# Patient Record
Sex: FEMALE | Race: WHITE
[De-identification: names, ages, dates, MRNs, and addresses within clinical notes are randomized per-mention and may not be internally consistent; named-entity substitution may affect disease eponyms.]

---

## 2018-01-26 ENCOUNTER — HOSPITAL ENCOUNTER (EMERGENCY)
Dept: HOSPITAL 42 - ED | Age: 28
Discharge: HOME | End: 2018-01-26
Payer: COMMERCIAL

## 2018-01-26 VITALS — RESPIRATION RATE: 16 BRPM | SYSTOLIC BLOOD PRESSURE: 105 MMHG | HEART RATE: 72 BPM | DIASTOLIC BLOOD PRESSURE: 70 MMHG

## 2018-01-26 VITALS — OXYGEN SATURATION: 100 % | TEMPERATURE: 98.4 F

## 2018-01-26 VITALS — BODY MASS INDEX: 22.2 KG/M2

## 2018-01-26 DIAGNOSIS — N83.201: ICD-10-CM

## 2018-01-26 DIAGNOSIS — R10.2: ICD-10-CM

## 2018-01-26 DIAGNOSIS — N83.202: Primary | ICD-10-CM

## 2018-01-26 LAB
APPEARANCE UR: CLEAR
BACTERIA #/AREA URNS HPF: (no result) /[HPF]
BILIRUB UR-MCNC: NEGATIVE MG/DL
COLOR UR: YELLOW
GLUCOSE UR STRIP-MCNC: NEGATIVE MG/DL
LEUKOCYTE ESTERASE UR-ACNC: NEGATIVE LEU/UL
PH UR STRIP: 7 [PH] (ref 4.7–8)
PROT UR STRIP-MCNC: 30 MG/DL
RBC # UR STRIP: NEGATIVE /UL
RBC #/AREA URNS HPF: (no result) /HPF (ref 0–2)
SP GR UR STRIP: 1.01 (ref 1–1.03)
URINE NITRATE: NEGATIVE
UROBILINOGEN UR STRIP-ACNC: 0.2 E.U./DL
WBC #/AREA URNS HPF: (no result) /HPF (ref 0–6)

## 2018-01-26 NOTE — US
HISTORY:

Left pelvic pain



COMPARISON:

None.



TECHNIQUE:

Transvaginal pelvic ultrasound



FINDINGS:



UTERUS:

Measures 8.1 x 3.3 x 4.2 cm. Anteverted. 



ENDOMETRIUM:

Measures 4 mm in diameter.  



CERVIX:

No cervical abnormality identified.



RIGHT OVARY:

Measures 3.7 x 2.1 x 3.9 cm.  Blood flow is demonstrated. 1.3 cm and 

0.9 cm follicles/ cysts. 



LEFT OVARY:

Measures 2.5 x 2.2 x 2.7 cm.  Blood flow is demonstrated. 1.1 cm 

follicle/ cyst. 



FREE FLUID:

No significant free fluid noted.



OTHER FINDINGS:

None. 



IMPRESSION:

Bilateral ovarian follicles/cysts.

## 2018-01-26 NOTE — ED PDOC
Arrival/HPI





- General


Chief Complaint: Abdominal Pain


Time Seen by Provider: 01/26/18 16:24


Historian: Patient





- History of Present Illness


Narrative History of Present Illness (Text): 





01/26/18 17:27


28yo female with no PMHx who present with complaint of Left pelvic pain x 

3days. States pain is constant. Her LMP was last week. Denies nausea, vomiting, 

diarrhea, constipation, fever, chills, urinary symptoms, any other complaint.





Past Medical History





- Provider Review


Nursing Documentation Reviewed: Yes





- Infectious Disease


Hx of Infectious Diseases: None





- Hematological/Oncological


Hx Anemia: Yes (CYNTHIA)


Hx Blood Transfusions: No


Hx Blood Transfusion Reaction: No





- Musculoskeletal/Rheumatological


Hx Falls: No





- Genitourinary/Gynecological


Other/Comment: ruptured ovarian cyst





- Psychiatric


Hx Depression: No


Hx Emotional Abuse: No


Hx Physical Abuse: No


Hx Substance Use: No





- Surgical History


Other/Comment: ovarian cyst





- Anesthesia


Hx Anesthesia: Yes


Hx Anesthesia Reactions: No


Hx Malignant Hyperthermia: No





- Suicidal Assessment


Feels Threatened In Home Enviroment: No





Family/Social History





- Physician Review


Nursing Documentation Reviewed: Yes


Family/Social History: Unknown Family HX


Smoking Status: Never Smoked


Hx Alcohol Use: Yes


Frequency of alcohol use: Socially


Hx Substance Use: No


Hx Substance Use Treatment: No





Allergies/Home Meds


Allergies/Adverse Reactions: 


Allergies





No Known Allergies Allergy (Verified 07/01/12 04:35)


 











Review of Systems





- Physician Review


All systems were reviewed & negative as marked: Yes





- Review of Systems


Constitutional: Normal


Eyes: Normal


ENT: Normal


Respiratory: Normal


Cardiovascular: Normal


Gastrointestinal: Abdominal Pain.  absent: Constipation, Diarrhea, Nausea, 

Vomiting, Hematochezia, Hematemesis


Genitourinary Female: Normal


Musculoskeletal: Normal


Skin: Normal


Neurological: Normal


Endocrine: Normal


Hemo/Lymphatic: Normal


Psychiatric: Normal





Physical Exam


Vital Signs Reviewed: Yes


Vital Signs











  Temp Pulse Resp BP Pulse Ox


 


 01/26/18 18:43   72  16  105/70  100


 


 01/26/18 18:00   69  18  109/71  100


 


 01/26/18 16:34  98.4 F  76  18  107/73  100











Temperature: Afebrile


Blood Pressure: Normal


Pulse: Regular


Respiratory Rate: Normal


Appearance: Positive for: Well-Appearing, Non-Toxic, Comfortable


Pain Distress: None


Mental Status: Positive for: Alert and Oriented X 3





- Systems Exam


Head: Present: Atraumatic, Normocephalic


Pupils: Present: PERRL


Extroacular Muscles: Present: EOMI


Conjunctiva: Present: Normal


Mouth: Present: Moist Mucous Membranes


Neck: Present: Normal Range of Motion


Respiratory/Chest: Present: Clear to Auscultation, Good Air Exchange.  No: 

Respiratory Distress, Accessory Muscle Use


Cardiovascular: Present: Regular Rate and Rhythm, Normal S1, S2.  No: Murmurs


Abdomen: Present: Tenderness (Left pelvic tenderness), Normal Bowel Sounds, 

Other (soft).  No: Distention, Peritoneal Signs, Rebound, Guarding, McBurney's 

Point Tender, Rovsing's Sign Present


Back: Present: Normal Inspection


Upper Extremity: Present: Normal Inspection.  No: Cyanosis, Edema


Lower Extremity: Present: Normal Inspection.  No: Edema


Neurological: Present: GCS=15, CN II-XII Intact, Speech Normal


Skin: Present: Warm, Dry, Normal Color.  No: Rashes


Psychiatric: Present: Alert, Oriented x 3, Normal Insight, Normal Concentration





Medical Decision Making


ED Course and Treatment: 





01/26/18 18:45


Pt in ED with stated history. She denies NVDC. She had mild left pelvic 

tenderness. Her pain improved with Ibuprofen 600mg in ED.





Transvaginal US - B/L follicles/cyst





Result was DW the pt and she will be DC home with ibuprofen. Referred to a GYN.





- Lab Interpretations


Lab Results: 


 Lab Results





01/26/18 17:00: Urine Color Yellow, Urine Appearance Clear, Urine pH 7.0, Ur 

Specific Gravity 1.015, Urine Protein 30 H, Urine Glucose (UA) Negative, Urine 

Ketones Negative, Urine Blood Negative, Urine Nitrate Negative, Urine Bilirubin 

Negative, Urine Urobilinogen 0.2, Ur Leukocyte Esterase Negative, Urine RBC 0 - 

2, Urine WBC 0 - 2, Ur Epithelial Cells 10 - 12, Urine Bacteria Mod











- RAD Interpretation


Radiology Orders: 








01/26/18 16:56


TRANSVAGINAL [US] Stat 














- Medication Orders


Current Medication Orders: 











Discontinued Medications





Ibuprofen (Motrin Tab)  600 mg PO STAT STA


   Stop: 01/26/18 16:58


   Last Admin: 01/26/18 17:18  Dose: 600 mg





MAR Pain/Vitals


 Document     01/26/18 17:18  RG  (Rec: 01/26/18 17:21  RG  [id number]


     Pain Reassessment


      Is This A Pain ReAssessment?               Yes


     Sleep


      Is patient sleeping during reassessment?   No


     Presence of Pain


      Presence of Pain                           Yes


     Pain Scale Used


      Pain Scale Used                            Numeric


     Location


      Pain Location Body Site                    Abdomen


      Description                                Constant


      Intensity                                  8


      Pain Behavior                              Crying


Re-Assess: MAR Pain/Vitals


 Document     01/26/18 18:18  RG  (Rec: 01/26/18 18:45  RG  [id number]


     Pain Reassessment


      Is This A Pain ReAssessment?               Yes


     Sleep


      Is patient sleeping during reassessment?   No


     Presence of Pain


      Presence of Pain                           No














Disposition/Present on Arrival





- Present on Arrival


Any Indicators Present on Arrival: No


History of DVT/PE: No


History of Uncontrolled Diabetes: No


Urinary Catheter: No


History of Decub. Ulcer: No


History Surgical Site Infection Following: None





- Disposition


Have Diagnosis and Disposition been Completed?: Yes


Diagnosis: 


 Cyst of ovary, Pelvic pain





Disposition: HOME/ ROUTINE


Disposition Time: 19:00


Patient Plan: Discharge


Condition: STABLE


Discharge Instructions (ExitCare):  Pelvic Pain in Women (ED)


Additional Instructions: 


Follow up with your doctor/GYN


Return to ED for any new or worsening symptoms


Prescriptions: 


Ibuprofen [Motrin Tab] 600 mg PO Q6 #20 tab


Referrals: 


PCP,NO [Primary Care Provider] - Follow up with primary


Isaias Garcia MD [Non-Staff] - Follow up with primary


[facility] [Outside] - Follow up with primary


Forms:  [employer] (English)

## 2018-05-07 ENCOUNTER — HOSPITAL ENCOUNTER (EMERGENCY)
Dept: HOSPITAL 42 - ED | Age: 28
Discharge: HOME | End: 2018-05-07
Payer: SELF-PAY

## 2018-05-07 VITALS — BODY MASS INDEX: 21.4 KG/M2

## 2018-05-07 VITALS — TEMPERATURE: 98.6 F

## 2018-05-07 DIAGNOSIS — N39.0: Primary | ICD-10-CM

## 2018-05-07 LAB
APPEARANCE UR: (no result)
BACTERIA #/AREA URNS HPF: (no result) /[HPF]
BILIRUB UR-MCNC: NEGATIVE MG/DL
COLOR UR: YELLOW
EPI CELLS #/AREA URNS HPF: (no result) /HPF (ref 0–5)
GLUCOSE UR STRIP-MCNC: NEGATIVE MG/DL
HCG,QUALITATIVE URINE: NEGATIVE
LEUKOCYTE ESTERASE UR-ACNC: (no result) LEU/UL
PH UR STRIP: 7.5 [PH] (ref 4.7–8)
PROT UR STRIP-MCNC: 30 MG/DL
RBC # UR STRIP: (no result) /UL
RBC #/AREA URNS HPF: (no result) /HPF (ref 0–2)
SP GR UR STRIP: 1.02 (ref 1–1.03)
UROBILINOGEN UR STRIP-ACNC: 0.2 E.U./DL
WBC #/AREA URNS HPF: (no result) /HPF (ref 0–6)

## 2018-05-07 NOTE — ED PDOC
Arrival/HPI





- General


Chief Complaint: Abdominal Pain


Time Seen by Provider: 05/07/18 21:03


Historian: Patient





- History of Present Illness


Narrative History of Present Illness (Text): 





05/07/18 21:11


Patient is a 27 year old female who presents to the Emergency department 

complaining of urinary frequency and burning sensation on urination. Patient 

has no history of back pain and denies any vaginal discharge. Patient also 

denies any fever, chills, nausea, or vomiting.











Symptom Onset: Sudden


Symptom Course: Unchanged


Quality: Burning


Context: Home





Past Medical History





- Provider Review


Nursing Documentation Reviewed: Yes





- Infectious Disease


Hx of Infectious Diseases: None





- Cardiac


Hx Cardiac Disorders: No





- Pulmonary


Hx Respiratory Disorders: No





- Neurological


Hx Neurological Disorder: No





- HEENT


Hx HEENT Disorder: No





- Renal


Hx Renal Disorder: No





- Endocrine/Metabolic


Hx Endocrine Disorders: No





- Hematological/Oncological


Hx Blood Disorders: Yes


Hx Anemia: Yes (CYNTHIA)





- Integumentary


Hx Dermatological Disorder: No





- Musculoskeletal/Rheumatological


Hx Musculoskeletal Disorders: No





- Gastrointestinal


Hx Gastrointestinal Disorders: No





- Genitourinary/Gynecological


Hx Genitourinary Disorders: Yes


Other/Comment: ruptured ovarian cyst





- Psychiatric


Hx Psychophysiologic Disorder: No


Hx Substance Use: No





- Surgical History


Other/Comment: ovarian cyst





- Anesthesia


Hx Anesthesia: Yes


Hx Anesthesia Reactions: No


Hx Malignant Hyperthermia: No





- Suicidal Assessment


Feels Threatened In Home Enviroment: No





Family/Social History





- Physician Review


Nursing Documentation Reviewed: Yes


Family/Social History: No Known Family HX


Smoking Status: Current Some Days Smoker


Hx Alcohol Use: No


Hx Substance Use: No


Hx Substance Use Treatment: No





Allergies/Home Meds


Allergies/Adverse Reactions: 


Allergies





No Known Allergies Allergy (Verified 05/07/18 20:50)


 











Review of Systems





- Physician Review


All systems were reviewed & negative as marked: Yes





- Review of Systems


Constitutional: absent: Fevers, Night Sweats


Gastrointestinal: absent: Nausea, Vomiting


Genitourinary Female: Dysuria, Frequency





Physical Exam


Vital Signs Reviewed: Yes


Vital Signs











  Temp Pulse Resp BP Pulse Ox


 


 05/07/18 20:50  98.6 F  86  17  111/71  96











Temperature: Afebrile


Blood Pressure: Normal


Pulse: Regular


Respiratory Rate: Normal


Appearance: Positive for: Well-Appearing


Pain Distress: None


Mental Status: Positive for: Alert and Oriented X 3





- Systems Exam


Head: Present: Atraumatic, Normocephalic


Pupils: Present: PERRL


Extroacular Muscles: Present: EOMI


Conjunctiva: Present: Normal


Mouth: Present: Moist Mucous Membranes


Neck: Present: Normal Range of Motion


Respiratory/Chest: Present: Clear to Auscultation, Good Air Exchange.  No: 

Respiratory Distress, Accessory Muscle Use


Cardiovascular: Present: Regular Rate and Rhythm, Normal S1, S2.  No: Murmurs


Abdomen: Present: Normal Bowel Sounds.  No: Tenderness, Distention, Peritoneal 

Signs


Back: Present: Normal Inspection.  No: CVA Tenderness


Upper Extremity: Present: Normal Inspection.  No: Cyanosis, Edema


Lower Extremity: Present: Normal Inspection.  No: Edema


Neurological: Present: GCS=15, CN II-XII Intact, Speech Normal


Skin: Present: Warm, Dry, Normal Color.  No: Rashes


Psychiatric: Present: Alert, Oriented x 3, Normal Insight, Normal Concentration





Medical Decision Making


ED Course and Treatment: 





05/07/18 21:11


Impression:


Patient is a 27 year old female with dysuria and increased urination frequency.








Differential Diagnosis included but are not limited to:  cystitis vs 

pyelonephritis vs PID





Plan:


--Urine culture


--urinalysis


-- Reassess and disposition





Prior Visits:


Notes and results from previous visits were reviewed. Patient was last seen in 

the emergency department on 1/26/18 complaining of abdominal pain.





Progress Notes:








05/07/18 23:28


Reevaluation:


On reevaluation the patient feels better and is in no acute distress. I have 

discussed the results and plan with the patient, who expresses understanding. 

Patient given the opportunity to ask question, all questions were answered and 

there is agreement with the plan to discharge the patient home with 

prescription for Keflex and Pyridium.  Patient is stable for discharge. Patient 

was instructed to follow up with physician/clinic in 1-2 days or return if 

symptoms persist/worsen or new concerning symptoms arise.








- Lab Interpretations


Lab Results: 


 Lab Results





05/07/18 21:15: Urine Color Yellow, Urine Appearance Slight-cloudy, Urine pH 7.5

, Ur Specific Gravity 1.025, Urine Protein 30 H, Urine Glucose (UA) Negative, 

Urine Ketones Negative, Urine Blood Moderate H, Urine Nitrate Negative, Urine 

Bilirubin Negative, Urine Urobilinogen 0.2, Ur Leukocyte Esterase Moderate H, 

Urine RBC 0 - 2, Urine WBC 25 - 30, Ur Epithelial Cells 0 - 2, Urine Bacteria 

Trace, Urine HCG, Qual Negative











- Medication Orders


Current Medication Orders: 











Discontinued Medications





Cephalexin Monohydrate (Keflex)  500 mg PO ONCE STA


   PRN Reason: Protocol


   Stop: 05/07/18 23:23


   Last Admin: 05/07/18 23:46  Dose: 500 mg





Phenazopyridine HCl (Pyridium)  200 mg PO STAT STA


   Stop: 05/07/18 23:20


   Last Admin: 05/07/18 23:46  Dose: 200 mg











- Scribe Statement


The provider has reviewed the documentation as recorded by the Bridget Peterson





Provider Scribe Attestation:


All medical record entries made by the Scribe were at my direction and 

personally dictated by me. I have reviewed the chart and agree that the record 

accurately reflects my personal performance of the history, physical exam, 

medical decision making, and the department course for this patient. I have 

also personally directed, reviewed, and agree with the discharge instructions 

and disposition.








Disposition/Present on Arrival





- Present on Arrival


Any Indicators Present on Arrival: No


History of DVT/PE: No


History of Uncontrolled Diabetes: No


Urinary Catheter: No


History of Decub. Ulcer: No


History Surgical Site Infection Following: None





- Disposition


Have Diagnosis and Disposition been Completed?: Yes


Diagnosis: 


 UTI (urinary tract infection)





Disposition: HOME/ ROUTINE


Disposition Time: 23:17


Patient Plan: Discharge


Patient Problems: 


 Current Active Problems











Problem Status Onset


 


UTI (urinary tract infection) Acute  











Condition: GOOD


Discharge Instructions (ExitCare):  Urinary Tract Infection, Adult (DC)


Additional Instructions: 


Drink plenty of liquids/take meds as prescribed/follow up with your doctor this 

week


Prescriptions: 


Cyclobenzaprine [Cyclobenzaprine HCl] 10 mg PO TID PRN #15 tab


 PRN Reason: Muscle Spasm


Cephalexin [cephalexin] 500 mg PO BID #14 cap


Referrals: 


PCP,NO [Primary Care Provider] - Follow up with primary


Forms:  [employer] (English)

## 2018-05-08 VITALS
RESPIRATION RATE: 16 BRPM | DIASTOLIC BLOOD PRESSURE: 71 MMHG | OXYGEN SATURATION: 99 % | HEART RATE: 76 BPM | SYSTOLIC BLOOD PRESSURE: 112 MMHG